# Patient Record
Sex: MALE | Race: WHITE | NOT HISPANIC OR LATINO | Employment: UNEMPLOYED | URBAN - METROPOLITAN AREA
[De-identification: names, ages, dates, MRNs, and addresses within clinical notes are randomized per-mention and may not be internally consistent; named-entity substitution may affect disease eponyms.]

---

## 2017-04-09 ENCOUNTER — APPOINTMENT (OUTPATIENT)
Dept: CT IMAGING | Age: 57
End: 2017-04-09
Attending: PHYSICIAN ASSISTANT

## 2017-04-09 ENCOUNTER — APPOINTMENT (OUTPATIENT)
Dept: GENERAL RADIOLOGY | Age: 57
End: 2017-04-09
Attending: PHYSICIAN ASSISTANT

## 2017-04-09 ENCOUNTER — HOSPITAL ENCOUNTER (OUTPATIENT)
Age: 57
Setting detail: OBSERVATION
Discharge: HOME | End: 2017-04-10
Attending: HOSPITALIST | Admitting: HOSPITALIST

## 2017-04-09 DIAGNOSIS — Z95.1 HISTORY OF CORONARY ARTERY BYPASS GRAFT: ICD-10-CM

## 2017-04-09 DIAGNOSIS — R07.9 CHEST PAIN, UNSPECIFIED TYPE: Primary | ICD-10-CM

## 2017-04-09 LAB
ALBUMIN SERPL-MCNC: 3.4 G/DL (ref 3.6–5.1)
ALBUMIN/GLOB SERPL: 1.2 {RATIO} (ref 1–2.4)
ALP SERPL-CCNC: 66 UNITS/L (ref 45–117)
ALT SERPL-CCNC: 47 UNITS/L
ANION GAP BLD CALC-SCNC: 19 MMOL/L
ANION GAP SERPL CALC-SCNC: 14 MMOL/L (ref 10–20)
AST SERPL-CCNC: 46 UNITS/L
BASOPHILS # BLD AUTO: 0 K/MCL (ref 0–0.3)
BASOPHILS NFR BLD AUTO: 0 %
BILIRUB SERPL-MCNC: 0.4 MG/DL (ref 0.2–1)
BNP SERPL-MCNC: 26 PG/ML
BUN BLD-MCNC: 23 MG/DL (ref 6–20)
BUN SERPL-MCNC: 20 MG/DL (ref 6–20)
BUN/CREAT SERPL: 31 (ref 7–25)
CA-I BLD ISE-SCNC: 1.14 MMOL/L (ref 1.15–1.29)
CALCIUM SERPL-MCNC: 8.6 MG/DL (ref 8.4–10.2)
CHLORIDE BLD-SCNC: 106 MMOL/L (ref 98–107)
CHLORIDE SERPL-SCNC: 106 MMOL/L (ref 98–107)
CO2 BLD-SCNC: 22 MMOL/L (ref 19–24)
CO2 SERPL-SCNC: 24 MMOL/L (ref 21–32)
CREAT BLD-MCNC: 0.6 MG/DL (ref 0.67–1.17)
CREAT SERPL-MCNC: 0.64 MG/DL (ref 0.67–1.17)
D DIMER PPP FEU-MCNC: 1.64 MG/L (FEU)
DIFFERENTIAL METHOD BLD: ABNORMAL
EOSINOPHIL # BLD AUTO: 0.2 K/MCL (ref 0.1–0.5)
EOSINOPHIL NFR SPEC: 3 %
ERYTHROCYTE [DISTWIDTH] IN BLOOD: 15.8 % (ref 11–15)
GLOBULIN SER-MCNC: 2.8 G/DL (ref 2–4)
GLUCOSE BLD-MCNC: 105 MG/DL (ref 65–99)
GLUCOSE SERPL-MCNC: 106 MG/DL (ref 65–99)
HCT VFR BLD CALC: 31.9 % (ref 39–51)
HCT VFR BLD CALC: 33 % (ref 39–51)
HGB BLD-MCNC: 10.5 G/DL (ref 13–17)
HGB BLD-MCNC: 11.2 G/DL (ref 13–17)
LIPASE SERPL-CCNC: 239 UNITS/L (ref 73–393)
LYMPHOCYTES # BLD MANUAL: 1.5 K/MCL (ref 1–4)
LYMPHOCYTES NFR BLD MANUAL: 21 %
MCH RBC QN AUTO: 31.4 PG (ref 26–34)
MCHC RBC AUTO-ENTMCNC: 32.9 G/DL (ref 32–36.5)
MCV RBC AUTO: 95.5 FL (ref 78–100)
MONOCYTES # BLD MANUAL: 0.5 K/MCL (ref 0.3–0.9)
MONOCYTES NFR BLD MANUAL: 6 %
NEUTROPHILS # BLD AUTO: 4.8 K/MCL (ref 1.8–7.7)
NEUTROPHILS NFR BLD AUTO: 70 %
PLATELET # BLD: 190 K/MCL (ref 140–450)
POTASSIUM BLD-SCNC: 4 MMOL/L (ref 3.4–5.1)
POTASSIUM SERPL-SCNC: 4.1 MMOL/L (ref 3.4–5.1)
PROT SERPL-MCNC: 6.2 G/DL (ref 6.4–8.2)
RBC # BLD: 3.34 MIL/MCL (ref 4.5–5.9)
SODIUM BLD-SCNC: 142 MMOL/L (ref 135–145)
SODIUM SERPL-SCNC: 140 MMOL/L (ref 135–145)
TROPONIN I BLD-MCNC: <0.1 NG/ML
TROPONIN I SERPL-MCNC: <0.02 NG/ML
WBC # BLD: 7 K/MCL (ref 4.2–11)

## 2017-04-09 PROCEDURE — 85025 COMPLETE CBC W/AUTO DIFF WBC: CPT

## 2017-04-09 PROCEDURE — 84484 ASSAY OF TROPONIN QUANT: CPT

## 2017-04-09 PROCEDURE — 99285 EMERGENCY DEPT VISIT HI MDM: CPT

## 2017-04-09 PROCEDURE — 93005 ELECTROCARDIOGRAM TRACING: CPT | Performed by: PHYSICIAN ASSISTANT

## 2017-04-09 PROCEDURE — 36415 COLL VENOUS BLD VENIPUNCTURE: CPT

## 2017-04-09 PROCEDURE — 71020 XR CHEST PA AND LATERAL: CPT

## 2017-04-09 PROCEDURE — 71020 XR CHEST PA AND LATERAL: CPT | Performed by: RADIOLOGY

## 2017-04-09 PROCEDURE — 85379 FIBRIN DEGRADATION QUANT: CPT

## 2017-04-09 PROCEDURE — 83690 ASSAY OF LIPASE: CPT

## 2017-04-09 PROCEDURE — 10004651 HB RX, NO CHARGE ITEM: Performed by: PHYSICIAN ASSISTANT

## 2017-04-09 PROCEDURE — 80047 BASIC METABLC PNL IONIZED CA: CPT

## 2017-04-09 PROCEDURE — 83880 ASSAY OF NATRIURETIC PEPTIDE: CPT

## 2017-04-09 PROCEDURE — 80053 COMPREHEN METABOLIC PANEL: CPT

## 2017-04-09 PROCEDURE — 99285 EMERGENCY DEPT VISIT HI MDM: CPT | Performed by: PHYSICIAN ASSISTANT

## 2017-04-09 PROCEDURE — 93010 ELECTROCARDIOGRAM REPORT: CPT | Performed by: EMERGENCY MEDICINE

## 2017-04-09 PROCEDURE — 36000 PLACE NEEDLE IN VEIN: CPT

## 2017-04-09 RX ORDER — METOPROLOL TARTRATE 50 MG/1
50 TABLET, FILM COATED ORAL 2 TIMES DAILY
COMMUNITY

## 2017-04-09 RX ORDER — NITROGLYCERIN 0.4 MG/1
0.4 TABLET SUBLINGUAL EVERY 5 MIN PRN
COMMUNITY

## 2017-04-09 RX ORDER — ASPIRIN 81 MG/1
324 TABLET, CHEWABLE ORAL ONCE
Status: COMPLETED | OUTPATIENT
Start: 2017-04-09 | End: 2017-04-09

## 2017-04-09 RX ORDER — PHENYTOIN SODIUM 100 MG/1
100 CAPSULE, EXTENDED RELEASE ORAL DAILY
COMMUNITY

## 2017-04-09 RX ADMIN — ASPIRIN 81 MG 324 MG: 81 TABLET ORAL at 22:44

## 2017-04-09 ASSESSMENT — PAIN SCALES - GENERAL
PAINLEVEL_OUTOF10: 0
PAINLEVEL_OUTOF10: 10

## 2017-04-10 ENCOUNTER — APPOINTMENT (OUTPATIENT)
Dept: CT IMAGING | Age: 57
End: 2017-04-10
Attending: HOSPITALIST

## 2017-04-10 ENCOUNTER — APPOINTMENT (OUTPATIENT)
Dept: NUCLEAR MEDICINE | Age: 57
End: 2017-04-10
Attending: HOSPITALIST

## 2017-04-10 ENCOUNTER — HOSPITAL ENCOUNTER (EMERGENCY)
Age: 57
Discharge: HOME | End: 2017-04-10
Attending: EMERGENCY MEDICINE

## 2017-04-10 VITALS
RESPIRATION RATE: 16 BRPM | DIASTOLIC BLOOD PRESSURE: 57 MMHG | SYSTOLIC BLOOD PRESSURE: 118 MMHG | WEIGHT: 230 LBS | BODY MASS INDEX: 36.1 KG/M2 | OXYGEN SATURATION: 100 % | TEMPERATURE: 98.2 F | HEIGHT: 67 IN | HEART RATE: 80 BPM

## 2017-04-10 VITALS
RESPIRATION RATE: 20 BRPM | BODY MASS INDEX: 35.71 KG/M2 | SYSTOLIC BLOOD PRESSURE: 139 MMHG | DIASTOLIC BLOOD PRESSURE: 93 MMHG | OXYGEN SATURATION: 94 % | WEIGHT: 227.51 LBS | TEMPERATURE: 98.6 F | HEART RATE: 103 BPM | HEIGHT: 67 IN

## 2017-04-10 DIAGNOSIS — R52 BODY ACHES: Primary | ICD-10-CM

## 2017-04-10 PROBLEM — F41.9 ANXIETY: Status: ACTIVE | Noted: 2017-04-10

## 2017-04-10 PROBLEM — R07.9 CHEST PAIN: Status: ACTIVE | Noted: 2017-04-10

## 2017-04-10 PROBLEM — Z95.1 HISTORY OF CORONARY ARTERY BYPASS GRAFT: Status: ACTIVE | Noted: 2017-04-10

## 2017-04-10 LAB
ALBUMIN SERPL-MCNC: 3.4 G/DL (ref 3.6–5.1)
ALP SERPL-CCNC: 62 UNITS/L (ref 45–117)
ALT SERPL-CCNC: 42 UNITS/L
ANION GAP BLD CALC-SCNC: 18 MMOL/L
ANION GAP SERPL CALC-SCNC: 13 MMOL/L (ref 10–20)
AST SERPL-CCNC: 31 UNITS/L
ATRIAL RATE (BPM): 71
ATRIAL RATE (BPM): 97
BASOPHILS # BLD AUTO: 0 K/MCL (ref 0–0.3)
BASOPHILS NFR BLD AUTO: 0 %
BILIRUB CONJ SERPL-MCNC: 0.1 MG/DL (ref 0–0.2)
BILIRUB SERPL-MCNC: 0.6 MG/DL (ref 0.2–1)
BUN BLD-MCNC: 12 MG/DL (ref 6–20)
BUN SERPL-MCNC: 18 MG/DL (ref 6–20)
BUN/CREAT SERPL: 32 (ref 7–25)
CA-I BLD ISE-SCNC: 1.06 MMOL/L (ref 1.15–1.29)
CALCIUM SERPL-MCNC: 8.2 MG/DL (ref 8.4–10.2)
CHLORIDE BLD-SCNC: 102 MMOL/L (ref 98–107)
CHLORIDE SERPL-SCNC: 106 MMOL/L (ref 98–107)
CHOLEST SERPL-MCNC: 171 MG/DL
CHOLEST/HDLC SERPL: 3.2 {RATIO}
CO2 BLD-SCNC: 25 MMOL/L (ref 19–24)
CO2 SERPL-SCNC: 25 MMOL/L (ref 21–32)
CREAT BLD-MCNC: 0.7 MG/DL (ref 0.67–1.17)
CREAT SERPL-MCNC: 0.57 MG/DL (ref 0.67–1.17)
DIFFERENTIAL METHOD BLD: ABNORMAL
EOSINOPHIL # BLD AUTO: 0.2 K/MCL (ref 0.1–0.5)
EOSINOPHIL NFR SPEC: 2 %
ERYTHROCYTE [DISTWIDTH] IN BLOOD: 15.9 % (ref 11–15)
GLUCOSE BLD-MCNC: 136 MG/DL (ref 65–99)
GLUCOSE SERPL-MCNC: 107 MG/DL (ref 65–99)
HCT VFR BLD CALC: 33 % (ref 39–51)
HCT VFR BLD CALC: 34.8 % (ref 39–51)
HDLC SERPL-MCNC: 53 MG/DL
HGB BLD-MCNC: 11.2 G/DL (ref 13–17)
HGB BLD-MCNC: 11.4 G/DL (ref 13–17)
LDLC SERPL-MCNC: 95 MG/DL
LYMPHOCYTES # BLD MANUAL: 1.5 K/MCL (ref 1–4)
LYMPHOCYTES NFR BLD MANUAL: 20 %
MCH RBC QN AUTO: 30.9 PG (ref 26–34)
MCHC RBC AUTO-ENTMCNC: 32.8 G/DL (ref 32–36.5)
MCV RBC AUTO: 94.3 FL (ref 78–100)
MONOCYTES # BLD MANUAL: 0.7 K/MCL (ref 0.3–0.9)
MONOCYTES NFR BLD MANUAL: 9 %
MRSA DNA SPEC QL NAA+PROBE: NOT DETECTED
NEUTROPHILS # BLD AUTO: 5.5 K/MCL (ref 1.8–7.7)
NEUTROPHILS NFR BLD AUTO: 69 %
NONHDLC SERPL-MCNC: 118 MG/DL
P AXIS (DEGREES): 58
P AXIS (DEGREES): 65
PLATELET # BLD: 258 K/MCL (ref 140–450)
POTASSIUM BLD-SCNC: 4.1 MMOL/L (ref 3.4–5.1)
POTASSIUM SERPL-SCNC: 3.9 MMOL/L (ref 3.4–5.1)
PR-INTERVAL (MSEC): 150
PR-INTERVAL (MSEC): 166
PROT SERPL-MCNC: 6.3 G/DL (ref 6.4–8.2)
QRS-INTERVAL (MSEC): 82
QRS-INTERVAL (MSEC): 88
QT-INTERVAL (MSEC): 360
QT-INTERVAL (MSEC): 428
QTC: 457
QTC: 465
R AXIS (DEGREES): 43
R AXIS (DEGREES): 57
RBC # BLD: 3.69 MIL/MCL (ref 4.5–5.9)
REPORT TEXT: NORMAL
SODIUM BLD-SCNC: 141 MMOL/L (ref 135–145)
SODIUM SERPL-SCNC: 140 MMOL/L (ref 135–145)
SPECIMEN SOURCE: NORMAL
T AXIS (DEGREES): 61
T AXIS (DEGREES): 74
TRIGL SERPL-MCNC: 113 MG/DL
TROPONIN I BLD-MCNC: <0.1 NG/ML
TROPONIN I BLD-MCNC: <0.1 NG/ML
TROPONIN I SERPL-MCNC: <0.02 NG/ML
VENTRICULAR RATE EKG/MIN (BPM): 71
VENTRICULAR RATE EKG/MIN (BPM): 97
WBC # BLD: 7.9 K/MCL (ref 4.2–11)

## 2017-04-10 PROCEDURE — 93017 CV STRESS TEST TRACING ONLY: CPT | Performed by: HOSPITALIST

## 2017-04-10 PROCEDURE — 71275 CT ANGIOGRAPHY CHEST: CPT | Performed by: RADIOLOGY

## 2017-04-10 PROCEDURE — 99285 EMERGENCY DEPT VISIT HI MDM: CPT | Performed by: EMERGENCY MEDICINE

## 2017-04-10 PROCEDURE — 84484 ASSAY OF TROPONIN QUANT: CPT

## 2017-04-10 PROCEDURE — 10004651 HB RX, NO CHARGE ITEM: Performed by: HOSPITALIST

## 2017-04-10 PROCEDURE — 80061 LIPID PANEL: CPT

## 2017-04-10 PROCEDURE — G0378 HOSPITAL OBSERVATION PER HR: HCPCS

## 2017-04-10 PROCEDURE — 80076 HEPATIC FUNCTION PANEL: CPT

## 2017-04-10 PROCEDURE — 99220 INITIAL OBSERVATION CARE,LEVL III: CPT | Performed by: HOSPITALIST

## 2017-04-10 PROCEDURE — 96372 THER/PROPH/DIAG INJ SC/IM: CPT | Performed by: HOSPITALIST

## 2017-04-10 PROCEDURE — A9500 TC99M SESTAMIBI: HCPCS

## 2017-04-10 PROCEDURE — 93018 CV STRESS TEST I&R ONLY: CPT | Performed by: INTERNAL MEDICINE

## 2017-04-10 PROCEDURE — 93016 CV STRESS TEST SUPVJ ONLY: CPT | Performed by: INTERNAL MEDICINE

## 2017-04-10 PROCEDURE — 10002800 HB RX 250 W HCPCS: Performed by: HOSPITALIST

## 2017-04-10 PROCEDURE — 10002803 HB RX 637: Performed by: INTERNAL MEDICINE

## 2017-04-10 PROCEDURE — 36415 COLL VENOUS BLD VENIPUNCTURE: CPT

## 2017-04-10 PROCEDURE — 80048 BASIC METABOLIC PNL TOTAL CA: CPT

## 2017-04-10 PROCEDURE — 78452 HT MUSCLE IMAGE SPECT MULT: CPT | Performed by: INTERNAL MEDICINE

## 2017-04-10 PROCEDURE — 93017 CV STRESS TEST TRACING ONLY: CPT | Performed by: INTERNAL MEDICINE

## 2017-04-10 PROCEDURE — 87641 MR-STAPH DNA AMP PROBE: CPT

## 2017-04-10 PROCEDURE — 10002803 HB RX 637: Performed by: HOSPITALIST

## 2017-04-10 PROCEDURE — 99244 OFF/OP CNSLTJ NEW/EST MOD 40: CPT | Performed by: PSYCHIATRY & NEUROLOGY

## 2017-04-10 PROCEDURE — 85025 COMPLETE CBC W/AUTO DIFF WBC: CPT

## 2017-04-10 PROCEDURE — 71275 CT ANGIOGRAPHY CHEST: CPT

## 2017-04-10 PROCEDURE — 10002805 HB CONTRAST AGENT: Performed by: RADIOLOGY

## 2017-04-10 RX ORDER — ACETAMINOPHEN 325 MG/1
650 TABLET ORAL EVERY 4 HOURS PRN
Status: DISCONTINUED | OUTPATIENT
Start: 2017-04-10 | End: 2017-04-10 | Stop reason: HOSPADM

## 2017-04-10 RX ORDER — TRAZODONE HYDROCHLORIDE 50 MG/1
50 TABLET ORAL NIGHTLY
Status: DISCONTINUED | OUTPATIENT
Start: 2017-04-10 | End: 2017-04-10 | Stop reason: HOSPADM

## 2017-04-10 RX ORDER — NITROGLYCERIN 0.4 MG/1
0.4 TABLET SUBLINGUAL EVERY 5 MIN PRN
Status: DISCONTINUED | OUTPATIENT
Start: 2017-04-10 | End: 2017-04-10 | Stop reason: HOSPADM

## 2017-04-10 RX ORDER — LEVETIRACETAM 500 MG/1
500 TABLET ORAL DAILY
COMMUNITY

## 2017-04-10 RX ORDER — ASPIRIN 81 MG/1
81 TABLET ORAL DAILY
Qty: 30 TABLET | Refills: 0 | Status: SHIPPED | OUTPATIENT
Start: 2017-04-10

## 2017-04-10 RX ORDER — ENOXAPARIN SODIUM 100 MG/ML
40 INJECTION SUBCUTANEOUS EVERY 24 HOURS
Status: DISCONTINUED | OUTPATIENT
Start: 2017-04-10 | End: 2017-04-10 | Stop reason: HOSPADM

## 2017-04-10 RX ORDER — CLONAZEPAM 0.5 MG/1
.5-1 TABLET ORAL 2 TIMES DAILY PRN
Status: DISCONTINUED | OUTPATIENT
Start: 2017-04-10 | End: 2017-04-10 | Stop reason: HOSPADM

## 2017-04-10 RX ORDER — REGADENOSON 0.08 MG/ML
0.4 INJECTION, SOLUTION INTRAVENOUS ONCE
Status: COMPLETED | OUTPATIENT
Start: 2017-04-10 | End: 2017-04-10

## 2017-04-10 RX ORDER — PHENYTOIN SODIUM 100 MG/1
100 CAPSULE, EXTENDED RELEASE ORAL DAILY
Status: DISCONTINUED | OUTPATIENT
Start: 2017-04-10 | End: 2017-04-10 | Stop reason: HOSPADM

## 2017-04-10 RX ORDER — LEVETIRACETAM 500 MG/1
500 TABLET ORAL DAILY
Status: DISCONTINUED | OUTPATIENT
Start: 2017-04-10 | End: 2017-04-10 | Stop reason: HOSPADM

## 2017-04-10 RX ORDER — METOPROLOL TARTRATE 50 MG/1
50 TABLET, FILM COATED ORAL 2 TIMES DAILY
Status: DISCONTINUED | OUTPATIENT
Start: 2017-04-10 | End: 2017-04-10 | Stop reason: HOSPADM

## 2017-04-10 RX ORDER — LORAZEPAM 2 MG/ML
1 INJECTION INTRAMUSCULAR ONCE
Status: COMPLETED | OUTPATIENT
Start: 2017-04-10 | End: 2017-04-10

## 2017-04-10 RX ADMIN — LORAZEPAM 1 MG: 2 INJECTION INTRAMUSCULAR; INTRAVENOUS at 23:04

## 2017-04-10 RX ADMIN — ASPIRIN 81 MG: 81 TABLET ORAL at 12:09

## 2017-04-10 RX ADMIN — TRAZODONE HYDROCHLORIDE 50 MG: 50 TABLET, FILM COATED ORAL at 01:54

## 2017-04-10 RX ADMIN — ENOXAPARIN SODIUM 40 MG: 40 INJECTION SUBCUTANEOUS at 01:54

## 2017-04-10 RX ADMIN — IOPAMIDOL 90 ML: 755 INJECTION, SOLUTION INTRAVENOUS at 10:10

## 2017-04-10 RX ADMIN — LEVETIRACETAM 500 MG: 500 TABLET, FILM COATED ORAL at 12:09

## 2017-04-10 RX ADMIN — SODIUM CHLORIDE 2 ML: 9 INJECTION, SOLUTION INTRAMUSCULAR; INTRAVENOUS; SUBCUTANEOUS at 13:47

## 2017-04-10 RX ADMIN — PHENYTOIN SODIUM 100 MG: 100 CAPSULE, EXTENDED RELEASE ORAL at 12:09

## 2017-04-10 RX ADMIN — METOPROLOL TARTRATE 50 MG: 50 TABLET, FILM COATED ORAL at 17:45

## 2017-04-10 RX ADMIN — REGADENOSON 0.4 MG: 0.08 INJECTION, SOLUTION INTRAVENOUS at 11:24

## 2017-04-10 ASSESSMENT — ACTIVITIES OF DAILY LIVING (ADL)
ADL_SCORE: 24
ADL_SHORT_OF_BREATH: NO
FEEDING: INDEPENDENT
ADL_BEFORE_ADMISSION: INDEPENDENT
DRESSING: INDEPENDENT
RECENT_DECLINE_ADL: NO
TOILETING: INDEPENDENT
CHRONIC_PAIN_PRESENT: NO
BATHING: INDEPENDENT

## 2017-04-10 ASSESSMENT — LIFESTYLE VARIABLES
E-CIGARETTE_USE: NO E-CIGARETTES USE IN THE LAST 30 DAYS
HOW OFTEN DO YOU HAVE 6 OR MORE DRINKS ON ONE OCCASION: NEVER
HOW OFTEN DO YOU HAVE A DRINK CONTAINING ALCOHOL: NEVER
AUDIT-C TOTAL SCORE: 0
ALCOHOL_USE_STATUS: NO OR LOW RISK WITH VALIDATED TOOL
HOW MANY STANDARD DRINKS CONTAINING ALCOHOL DO YOU HAVE ON A TYPICAL DAY: 0,1 OR 2
TOBACCO_USE_STATUS_IN_THE_LAST_30_DAYS: NO TOBACCO USED IN THE LAST 30 DAYS

## 2017-04-10 ASSESSMENT — PAIN SCALES - GENERAL
PAIN_LEVEL_AT_REST: 0
PAIN_LEVEL_AT_REST: 0
PAIN_LEVEL_AT_REST: 2
PAINLEVEL_OUTOF10: 10
PAINLEVEL_OUTOF10: 10
PAIN_LEVEL_AT_REST: 0

## 2017-04-10 ASSESSMENT — COGNITIVE AND FUNCTIONAL STATUS - GENERAL
ARE YOU DEAF OR DO YOU HAVE SERIOUS DIFFICULTY  HEARING: NO
ARE YOU BLIND OR DO YOU HAVE SERIOUS DIFFICULTY SEEING, EVEN WHEN WEARING GLASSES: NO

## 2017-04-11 LAB
ATRIAL RATE (BPM): 82
P AXIS (DEGREES): 54
PR-INTERVAL (MSEC): 158
QRS-INTERVAL (MSEC): 82
QT-INTERVAL (MSEC): 392
QTC: 457
R AXIS (DEGREES): 44
REPORT TEXT: NORMAL
T AXIS (DEGREES): 61
VENTRICULAR RATE EKG/MIN (BPM): 82

## 2017-04-13 ENCOUNTER — TELEPHONE (OUTPATIENT)
Dept: BEHAVIORAL HEALTH | Age: 57
End: 2017-04-13

## 2017-04-13 PROCEDURE — 36415 COLL VENOUS BLD VENIPUNCTURE: CPT

## 2017-04-13 PROCEDURE — 99283 EMERGENCY DEPT VISIT LOW MDM: CPT

## 2017-04-14 ENCOUNTER — HOSPITAL ENCOUNTER (EMERGENCY)
Age: 57
Discharge: HOME | End: 2017-04-14
Attending: EMERGENCY MEDICINE

## 2017-04-14 VITALS
HEART RATE: 92 BPM | TEMPERATURE: 99.3 F | OXYGEN SATURATION: 97 % | DIASTOLIC BLOOD PRESSURE: 72 MMHG | WEIGHT: 230 LBS | HEIGHT: 67 IN | SYSTOLIC BLOOD PRESSURE: 132 MMHG | RESPIRATION RATE: 20 BRPM | BODY MASS INDEX: 36.1 KG/M2

## 2017-04-14 DIAGNOSIS — F41.9 ANXIETY: Primary | ICD-10-CM

## 2017-04-14 LAB
ATRIAL RATE (BPM): 92
BASOPHILS # BLD AUTO: 0 K/MCL (ref 0–0.3)
BASOPHILS NFR BLD AUTO: 0 %
DIFFERENTIAL METHOD BLD: ABNORMAL
EOSINOPHIL # BLD AUTO: 0.1 K/MCL (ref 0.1–0.5)
EOSINOPHIL NFR SPEC: 2 %
ERYTHROCYTE [DISTWIDTH] IN BLOOD: 15.7 % (ref 11–15)
HCT VFR BLD CALC: 35.1 % (ref 39–51)
HEMOCCULT STL QL: NORMAL
HGB BLD-MCNC: 11.7 G/DL (ref 13–17)
LYMPHOCYTES # BLD MANUAL: 1.6 K/MCL (ref 1–4)
LYMPHOCYTES NFR BLD MANUAL: 20 %
MCH RBC QN AUTO: 31.3 PG (ref 26–34)
MCHC RBC AUTO-ENTMCNC: 33.3 G/DL (ref 32–36.5)
MCV RBC AUTO: 93.9 FL (ref 78–100)
MONOCYTES # BLD MANUAL: 0.6 K/MCL (ref 0.3–0.9)
MONOCYTES NFR BLD MANUAL: 8 %
NEUTROPHILS # BLD AUTO: 5.3 K/MCL (ref 1.8–7.7)
NEUTROPHILS NFR BLD AUTO: 70 %
P AXIS (DEGREES): 63
PLATELET # BLD: 257 K/MCL (ref 140–450)
PR-INTERVAL (MSEC): 160
QRS-INTERVAL (MSEC): 84
QT-INTERVAL (MSEC): 382
QTC: 472
R AXIS (DEGREES): 56
RBC # BLD: 3.74 MIL/MCL (ref 4.5–5.9)
REPORT TEXT: NORMAL
T AXIS (DEGREES): 67
VENTRICULAR RATE EKG/MIN (BPM): 92
WBC # BLD: 7.6 K/MCL (ref 4.2–11)

## 2017-04-14 PROCEDURE — 10002803 HB RX 637: Performed by: EMERGENCY MEDICINE

## 2017-04-14 PROCEDURE — 85025 COMPLETE CBC W/AUTO DIFF WBC: CPT

## 2017-04-14 PROCEDURE — 93005 ELECTROCARDIOGRAM TRACING: CPT | Performed by: EMERGENCY MEDICINE

## 2017-04-14 PROCEDURE — 82271 OCCULT BLOOD OTHER SOURCES: CPT | Performed by: EMERGENCY MEDICINE

## 2017-04-14 PROCEDURE — 99284 EMERGENCY DEPT VISIT MOD MDM: CPT | Performed by: EMERGENCY MEDICINE

## 2017-04-14 PROCEDURE — 93010 ELECTROCARDIOGRAM REPORT: CPT | Performed by: EMERGENCY MEDICINE

## 2017-04-14 RX ORDER — CLONAZEPAM 1 MG/1
1 TABLET ORAL ONCE
Status: COMPLETED | OUTPATIENT
Start: 2017-04-14 | End: 2017-04-14

## 2017-04-14 RX ADMIN — CLONAZEPAM 1 MG: 1 TABLET ORAL at 01:04

## 2017-04-14 ASSESSMENT — PAIN SCALES - GENERAL
PAINLEVEL_OUTOF10: 10
PAINLEVEL_OUTOF10: 10

## 2017-04-19 ENCOUNTER — HOSPITAL ENCOUNTER (EMERGENCY)
Age: 57
Discharge: HOME | End: 2017-04-19

## 2017-04-19 VITALS
SYSTOLIC BLOOD PRESSURE: 129 MMHG | RESPIRATION RATE: 16 BRPM | TEMPERATURE: 98.4 F | DIASTOLIC BLOOD PRESSURE: 91 MMHG | HEIGHT: 67 IN | HEART RATE: 117 BPM | OXYGEN SATURATION: 98 % | WEIGHT: 230 LBS | BODY MASS INDEX: 36.1 KG/M2

## 2017-04-19 DIAGNOSIS — F41.9 ANXIETY: Primary | ICD-10-CM

## 2017-04-19 PROCEDURE — 99282 EMERGENCY DEPT VISIT SF MDM: CPT | Performed by: PHYSICIAN ASSISTANT

## 2017-04-19 PROCEDURE — 99283 EMERGENCY DEPT VISIT LOW MDM: CPT

## 2017-04-19 ASSESSMENT — PAIN SCALES - GENERAL
PAINLEVEL_OUTOF10: 10
PAINLEVEL_OUTOF10: 0

## 2017-04-24 ENCOUNTER — OFF PREMISE (OUTPATIENT)
Dept: OTHER | Age: 57
End: 2017-04-24

## 2017-05-11 ENCOUNTER — HOSPITAL ENCOUNTER (EMERGENCY)
Age: 57
Discharge: HOME | End: 2017-05-11
Attending: EMERGENCY MEDICINE

## 2017-05-11 VITALS
SYSTOLIC BLOOD PRESSURE: 113 MMHG | BODY MASS INDEX: 34.53 KG/M2 | TEMPERATURE: 97.5 F | RESPIRATION RATE: 18 BRPM | OXYGEN SATURATION: 100 % | DIASTOLIC BLOOD PRESSURE: 60 MMHG | HEIGHT: 67 IN | WEIGHT: 220 LBS | HEART RATE: 90 BPM

## 2017-05-11 DIAGNOSIS — G40.909 SEIZURE DISORDER (CMD): Primary | ICD-10-CM

## 2017-05-11 LAB
BASE DEFICIT BLDV-SCNC: 4 MMOL/L (ref 0–2)
CA-I BLD ISE-SCNC: 0.78 MMOL/L (ref 1.15–1.29)
CO2 BLD-SCNC: 18 MMOL/L (ref 19–24)
ETHANOL SERPL-MCNC: NORMAL MG/DL
GLUCOSE BLD-MCNC: 74 MG/DL (ref 65–99)
HCO3 BLDV-SCNC: 17 MMOL/L (ref 22–28)
HCT VFR BLD CALC: 32 % (ref 39–51)
HGB BLD-MCNC: 10.9 G/DL (ref 13–17)
PCO2 BLDV: 16 MM HG (ref 41–54)
PH BLDV: 7.65 UNITS (ref 7.35–7.45)
PHENYTOIN SERPL-MCNC: <0.5 MCG/ML (ref 10–20)
PO2 BLDV: 115 MM HG (ref 35–42)
POTASSIUM BLD-SCNC: 3.8 MMOL/L (ref 3.4–5.1)
SAO2 % BLDV: 99 % (ref 60–80)
SODIUM BLD-SCNC: 140 MMOL/L (ref 135–145)

## 2017-05-11 PROCEDURE — 99284 EMERGENCY DEPT VISIT MOD MDM: CPT | Performed by: EMERGENCY MEDICINE

## 2017-05-11 PROCEDURE — 96365 THER/PROPH/DIAG IV INF INIT: CPT | Performed by: NURSE PRACTITIONER

## 2017-05-11 PROCEDURE — 84132 ASSAY OF SERUM POTASSIUM: CPT

## 2017-05-11 PROCEDURE — 80320 DRUG SCREEN QUANTALCOHOLS: CPT

## 2017-05-11 PROCEDURE — 99284 EMERGENCY DEPT VISIT MOD MDM: CPT | Performed by: NURSE PRACTITIONER

## 2017-05-11 PROCEDURE — 10002807 HB RX 258: Performed by: EMERGENCY MEDICINE

## 2017-05-11 PROCEDURE — 96360 HYDRATION IV INFUSION INIT: CPT | Performed by: NURSE PRACTITIONER

## 2017-05-11 PROCEDURE — 80185 ASSAY OF PHENYTOIN TOTAL: CPT

## 2017-05-11 PROCEDURE — 93005 ELECTROCARDIOGRAM TRACING: CPT | Performed by: EMERGENCY MEDICINE

## 2017-05-11 PROCEDURE — 10002800 HB RX 250 W HCPCS: Performed by: EMERGENCY MEDICINE

## 2017-05-11 RX ADMIN — SODIUM CHLORIDE 1000 ML: 9 INJECTION, SOLUTION INTRAVENOUS at 20:23

## 2017-05-11 RX ADMIN — FOSPHENYTOIN SODIUM 1500 MG PE: 50 INJECTION INTRAMUSCULAR; INTRAVENOUS at 21:32

## 2017-05-11 ASSESSMENT — ENCOUNTER SYMPTOMS
CHILLS: 0
CONFUSION: 0
BACK PAIN: 0
EYE PAIN: 0
SHORTNESS OF BREATH: 0
SEIZURES: 1
WOUND: 0
FEVER: 0
HEADACHES: 0
ABDOMINAL PAIN: 0

## 2017-05-11 ASSESSMENT — MOVEMENT AND STRENGTH ASSESSMENTS: FACE_JAW: NO FACIAL DROOP

## 2017-05-12 LAB
ATRIAL RATE (BPM): 91
P AXIS (DEGREES): 63
PR-INTERVAL (MSEC): 156
QRS-INTERVAL (MSEC): 88
QT-INTERVAL (MSEC): 356
QTC: 431
R AXIS (DEGREES): 57
REPORT TEXT: NORMAL
T AXIS (DEGREES): 63
VENTRICULAR RATE EKG/MIN (BPM): 88

## 2018-05-31 ENCOUNTER — HOSPITAL ENCOUNTER (INPATIENT)
Dept: HOSPITAL 36 - GERO | Age: 58
LOS: 9 days | Discharge: NURSING, REHAB OR LONG-TERM CARE | DRG: 885 | End: 2018-06-09
Attending: PSYCHIATRY & NEUROLOGY | Admitting: PSYCHIATRY & NEUROLOGY
Payer: MEDICARE

## 2018-05-31 VITALS — DIASTOLIC BLOOD PRESSURE: 96 MMHG | SYSTOLIC BLOOD PRESSURE: 163 MMHG

## 2018-05-31 DIAGNOSIS — E11.65: ICD-10-CM

## 2018-05-31 DIAGNOSIS — Z91.81: ICD-10-CM

## 2018-05-31 DIAGNOSIS — G40.909: ICD-10-CM

## 2018-05-31 DIAGNOSIS — E66.9: ICD-10-CM

## 2018-05-31 DIAGNOSIS — Z91.5: ICD-10-CM

## 2018-05-31 DIAGNOSIS — F29: ICD-10-CM

## 2018-05-31 DIAGNOSIS — F33.2: Primary | ICD-10-CM

## 2018-05-31 DIAGNOSIS — M19.90: ICD-10-CM

## 2018-05-31 DIAGNOSIS — F43.10: ICD-10-CM

## 2018-05-31 DIAGNOSIS — I10: ICD-10-CM

## 2018-05-31 DIAGNOSIS — Z95.1: ICD-10-CM

## 2018-05-31 DIAGNOSIS — I25.10: ICD-10-CM

## 2018-05-31 PROCEDURE — Z7610: HCPCS

## 2018-06-01 LAB
ALBUMIN SERPL-MCNC: 4.1 GM/DL (ref 4.2–5.5)
ALBUMIN/GLOB SERPL: 1.8 {RATIO} (ref 1–1.8)
ALP SERPL-CCNC: 87 U/L (ref 34–104)
ALT SERPL-CCNC: 20 U/L (ref 7–52)
AMYLASE SERPL-CCNC: 18 U/L (ref 29–103)
ANION GAP SERPL CALC-SCNC: 13.3 MMOL/L (ref 7–16)
AST SERPL-CCNC: 20 U/L (ref 13–39)
BASOPHILS # BLD AUTO: 0.1 TH/CUMM (ref 0–0.2)
BASOPHILS NFR BLD AUTO: 1.6 % (ref 0–2)
BILIRUB SERPL-MCNC: 0.5 MG/DL (ref 0.3–1)
BUN SERPL-MCNC: 7 MG/DL (ref 7–25)
CALCIUM SERPL-MCNC: 9.1 MG/DL (ref 8.6–10.3)
CHLORIDE SERPL-SCNC: 104 MEQ/L (ref 98–107)
CHOLEST SERPL-MCNC: 149 MG/DL (ref ?–200)
CO2 SERPL-SCNC: 26.3 MEQ/L (ref 21–31)
CREAT SERPL-MCNC: 0.7 MG/DL (ref 0.7–1.3)
EOSINOPHIL # BLD AUTO: 0.2 TH/CMM (ref 0.1–0.4)
EOSINOPHIL NFR BLD AUTO: 2.9 % (ref 0–5)
ERYTHROCYTE [DISTWIDTH] IN BLOOD BY AUTOMATED COUNT: 13.5 % (ref 11.5–20)
GLOBULIN SER-MCNC: 2.3 GM/DL
GLUCOSE SERPL-MCNC: 210 MG/DL (ref 70–105)
HBA1C MFR BLD: 8.4 % (ref 4–6)
HCT VFR BLD CALC: 36.8 % (ref 41–60)
HDLC SERPL-MCNC: 33 MG/DL (ref 23–92)
HGB BLD-MCNC: 11 G/DL (ref 4–35)
HGB BLD-MCNC: 12.6 GM/DL (ref 12–16)
INR PPP: 0.95 (ref 0.5–1.4)
LIPASE SERPL-CCNC: 24 U/L (ref 11–82)
LYMPHOCYTE AB SER FC-ACNC: 2 TH/CMM (ref 1.5–3)
LYMPHOCYTES NFR BLD AUTO: 25.7 % (ref 20–50)
MCH RBC QN AUTO: 31.8 PG (ref 26–30)
MCHC RBC AUTO-ENTMCNC: 34.3 PG (ref 28–36)
MCV RBC AUTO: 92.8 FL (ref 80–99)
MONOCYTES # BLD AUTO: 0.4 TH/CMM (ref 0.3–1)
MONOCYTES NFR BLD AUTO: 5 % (ref 2–10)
NEUTROPHILS # BLD: 4.9 TH/CMM (ref 1.8–8)
NEUTROPHILS NFR BLD AUTO: 64.8 % (ref 40–80)
PHENYTOIN SERPL-MCNC: 6.3 UG/ML (ref 10–20)
PLATELET # BLD: 213 TH/CMM (ref 150–400)
PMV BLD AUTO: 8.3 FL
POTASSIUM SERPL-SCNC: 3.6 MEQ/L (ref 3.5–5.1)
PROTHROMBIN TIME: 9.9 SECONDS (ref 9.5–11.5)
RBC # BLD AUTO: 3.97 MIL/CMM (ref 4.3–5.7)
SODIUM SERPL-SCNC: 140 MEQ/L (ref 136–145)
TRIGL SERPL-MCNC: 261 MG/DL (ref ?–150)
WBC # BLD AUTO: 7.6 TH/CMM (ref 4.8–10.8)

## 2018-06-01 RX ADMIN — INSULIN ASPART SCH UNIT: 100 INJECTION, SOLUTION INTRAVENOUS; SUBCUTANEOUS at 16:50

## 2018-06-01 RX ADMIN — INSULIN ASPART SCH UNIT: 100 INJECTION, SOLUTION INTRAVENOUS; SUBCUTANEOUS at 20:07

## 2018-06-01 RX ADMIN — INSULIN ASPART SCH UNIT: 100 INJECTION, SOLUTION INTRAVENOUS; SUBCUTANEOUS at 11:24

## 2018-06-01 NOTE — PSYCHOSOCIAL EVALUATION
DATE OF SERVICE:  05/31/2018



IDENTIFYING INFORMATION:  The patient is a 58-year-old male.



CHIEF COMPLAINT:  "I have overdosed."



HISTORY OF PRESENT ILLNESS:  The patient reports that he had overdosed on pills,

he said on Dilantin.  He was put on a hold for danger to self.  He wanted to

kill himself.  However, the hold states he took 30 Benadryl.  He said he took

Dilantin.  He was diagnosed with PTSD.  He has been depressed.  He wanted to

harm himself.  When I talked to him, he is not a very good historian.  He admits

to 2 prior suicide attempts with 3 prior hospitalizations.  He denies any

substance abuse.  The patient reports no current auditory or visual

hallucination, no paranoia; however, he said he was diagnosed with

schizophrenia.  He said he is on disability because of heart problems.



PAST PSYCHIATRIC HISTORY:  According to old records, the patient was diagnosed

here back in 2015 because of being suicidal.  He also has a history of being an

alcoholic.  He was in [place] in the past, attempted to overdose in the

past.  He used to be on Paxil.



MEDICAL HISTORY:  The patient has seizure disorder.



ALLERGIES:  He has no known drug allergies.



MEDICATIONS:  The patient is on metoprolol, multivitamin, nitroglycerin.  He is

on Paxil 20 mg a day and Dilantin 200 mg twice a day, which he used to be on in

the past.



FAMILY AND SOCIAL HISTORY:  The patient is alcoholic.  The patient reports he

has been  twice, , has 3 children, on disability for heart

problems, 12th grade education.  He used to work as a .  He lives in a

board and care.  He denies any family psychotic disorder, no legal problems, not

drinking.



MENTAL STATUS EXAMINATION:  The patient is appropriately dressed, not well

groomed.  He looks somewhat disheveled.  His mood is depressed.  Affect is sad. 

Thoughts are concrete.  He was unable to tell me the date.  He believes his YOB: 1960 but he believes he is 49 years of age.  He reports that

his sleep is not very well.  Appetite varies.  He denies now that he wants to

harm himself.  He was unable to tell me the name of the President of [place]

[place].  Concentration is poor.  Unable to answer questions appropriately

sometimes and spell his first name forward and backward.  Long-term memory is

good.  He can remember the date of birth but not his age.  Recent memory is

good.  He can remember that I am coming here.  His insight about his illness is

poor.  Judgment is poor.  He has been trying to harm himself.



IMPRESSION:

AXIS I:  Major depression, recurrent, severe, with no psychosis.  History of

alcohol dependence.



MEDICAL DIAGNOSES:  Heart problems, seizure disorder.



ASSETS:  He wants to get help.  Negative poor coping skills.



INITIAL TREATMENT PLAN:  The patient will be continued with Paxil.  We will do

group therapy, milieu therapy, and individual therapy.



ESTIMATED LENGTH OF STAY:  3-7 days.



DISCHARGE CRITERIA:  Decrease in depression, ____ discharge.





DD: 06/01/2018 11:52

DT: 06/01/2018 12:39

JOB# [id number]  [id number]

## 2018-06-01 NOTE — HISTORY & PHYSICAL
ADMIT DATE:  06/01/2018



PATIENT'S IDENTIFICATION:  A 58-year-old male.



REQUESTING PHYSICIAN:  Dr. Bhavik Gates.



PRESENTING COMPLAINT:  "I tried to kill myself."



HISTORY SOURCE:  Talking to the patient and reviewing the chart.



HISTORY OF PRESENT ILLNESS:  This is a 58-year-old  male who resides in

[place], visiting his daughter for graduation of ___son, presented to the

[facility] after the patient tried to kill himself with

overdosing Benadryl.  The patient was medically cleared and subsequently

[facility].



PAST MEDICAL HISTORY:  Remarkable for:

1.  Hypertension.

2.  Coronary artery disease, status post bypass graft.

3.  Seizure disorder.

4.  Diabetes.

5.  Obesity.

6.  Hypertension.

7.  DJD.

8.  Previous history of multiple suicidal attempts.



MEDICATIONS AT HOME:  Metoprolol, Norvasc, paroxetine, Dilantin.



ALLERGIES:  The patient is allergic to medications.



SOCIAL HISTORY:  The patient lives in [facility].  The patient has

no history of smoking cigarette, drinking alcohol, using street drug use.



FAMILY MEDICAL HISTORY:  Remarkable for diabetes and hypertension.



REVIEW OF SYSTEMS:  The patient currently denies any headache, blurred vision,

double vision, dysphagia, odynophagia, runny nose, stuffy nose, fever, chills,

cough, chest pain, shortness of breath, palpitation, dizziness, nausea,

vomiting, diarrhea, dysuria, hematuria, hematochezia, melena.  No history of any

seizure or syncopal episode.



PHYSICAL EXAMINATION:

GENERAL:  The patient is alert, awake, oriented, lying in the bed without any

acute distress.

VITAL SIGNS:  Temperature 97.5, pulse 91, respiratory rate 18, blood pressure

102/65.

SKIN:  Warm to touch.

HEENT:  Normocephalic, atraumatic.  Extraocular muscles are intact.  Tongue was

pink and coated.  Poor dentition noted.  No oral lesion noted.  No sinus

tenderness.

NECK:  Supple, no JVD.  No hepatojugular reflux.  No lymphadenopathy,

thyromegaly, or carotid bruit.

HEART:  Both heart sounds are regular.  Tachycardia noted.  No S3, no S4.

CHEST AND LUNGS:  Equal in expansion, no wheezing, no crackles.

ABDOMEN:  Protuberant, soft.  No guarding, no rigidity.  Bowel sounds are

present.  No palpable mass.

EXTREMITIES:  No edema, no cyanosis or clubbing.  Peripheral pulses are +2.  No

calf tenderness noted.

NEUROLOGIC:  Alert, awake, oriented to time, place, person.  2-12 cranial nerves

intact.  Power in upper and lower extremities 5-.  Sensation to touch is intact.

 Babinski in both toes are going down.  Cerebellar sign.  The patient has

broad-based gait with little bit of landing on his left leg noted and otherwise

nonfocal.



AVAILABLE DIAGNOSTIC DATA:  CBC:  White count of 9.7, hemoglobin 12.8, platelet

count 199.  Sodium 134, potassium 4.0, chloride 99, CO2 24, glucose 327, BUN and

creatinine are 4 and 0.83.  Liver functions are normal.  Tylenol level is less

than 10.  Salicylate level was less than 0.  Urine drug screen was negative. 

EKG has no ST-T changes representing acute ischemia.  Urinalysis remarkable for

significant for glucosuria noted.



CLINICAL IMPRESSIONS:

1.  Psychotic disorder exacerbation.

2.  Status post Benadryl overdose.

3.  Diabetes mellitus.

4.  Hypertension.

5.  Hyperlipidemia.

6.  Coronary artery disease, status post bypass graft.

7.  Degenerative joint disease.

8.  Seizure disorder.

9.  Obesity.



PLAN:  The patient is admitted at this time to [facility].  Psychiatric

illness management deferred to psychiatrist.  The patient is to resume his home

medication at this time.  Oral hypoglycemic agent will be added.  Seizure

precaution will be given.  Appropriate home medicine reconciliation for

hypertension, hyperlipidemia will be added, p.r.n. nitroglycerin will be added

as well.  The patient is to have fall precaution as well.  Dietary restriction

will be provided.  Nutritional support will be given as well and we will

continue to follow this patient during the stay in the hospital.



I sincerely thank you, Dr. Bhavik Gates for giving me the opportunity to

participate in patient of yours.  Please note, the patient is medically stable

to participate in the activity per the [facility].





DD: 06/01/2018 09:15

DT: 06/01/2018 12:17

JOB# [id number]  [id number]

## 2018-06-02 RX ADMIN — INSULIN ASPART SCH: 100 INJECTION, SOLUTION INTRAVENOUS; SUBCUTANEOUS at 11:27

## 2018-06-02 RX ADMIN — INSULIN ASPART SCH UNIT: 100 INJECTION, SOLUTION INTRAVENOUS; SUBCUTANEOUS at 06:31

## 2018-06-02 RX ADMIN — INSULIN ASPART SCH UNIT: 100 INJECTION, SOLUTION INTRAVENOUS; SUBCUTANEOUS at 20:32

## 2018-06-02 RX ADMIN — INSULIN ASPART SCH UNIT: 100 INJECTION, SOLUTION INTRAVENOUS; SUBCUTANEOUS at 18:36

## 2018-06-02 NOTE — PROGRESS NOTES
DATE:  06/02/2018



SUBJECTIVE:  The patient is currently in the hospital, overdosed on pills, put

on a hold.  He apparently wanted to kill himself, also took 30 Benadryl,

diagnosed with PTSD.  The patient not wanting to speak with me today, somewhat

resistant, isolative, depressed, withdrawn, ongoing concerns for suicidality. 

Staff noting he remains depressed, did not sleep very well, only 5 hours, lot of

anxiety, worries throughout the night, episodes of confusion.  Medications were

noted including doses and frequencies.



ASSESSMENT:  The patient remains depressed, withdrawn, highly anxious.  Ongoing

insomnia.  Concerns for suicidality.



PLAN:  We will continue to monitor.  We will err on the side of caution,

initiate a 14-day hold.





DD: 06/02/2018 06:41

DT: 06/02/2018 17:48

JOB# [id number]  [id number]

## 2018-06-03 LAB
APPEARANCE UR: (no result)
BACTERIA #/AREA URNS HPF: (no result) /HPF
BILIRUB UR-MCNC: (no result) MG/DL
COLOR UR: YELLOW
EPI CELLS URNS QL MICRO: (no result) /LPF
GLUCOSE UR STRIP-MCNC: NEGATIVE MG/DL
KETONES UR STRIP-MCNC: NEGATIVE MG/DL
LEUKOCYTE ESTERASE UR-ACNC: NEGATIVE
MICRO URNS: YES
NITRITE UR QL STRIP: NEGATIVE
PH UR STRIP: 5.5 [PH] (ref 4.6–8)
PROT UR STRIP-MCNC: NEGATIVE MG/DL
RBC # UR STRIP: NEGATIVE /UL
RBC #/AREA URNS HPF: (no result) /HPF (ref 0–5)
SP GR UR STRIP: >= 1.03 (ref 1–1.03)
URINALYSIS COMPLETE PNL UR: (no result)
UROBILINOGEN UR STRIP-ACNC: 0.2 E.U./DL (ref 0.2–1)
WBC #/AREA URNS HPF: (no result) /HPF (ref 0–5)

## 2018-06-03 RX ADMIN — INSULIN ASPART SCH: 100 INJECTION, SOLUTION INTRAVENOUS; SUBCUTANEOUS at 17:29

## 2018-06-03 RX ADMIN — INSULIN ASPART SCH UNIT: 100 INJECTION, SOLUTION INTRAVENOUS; SUBCUTANEOUS at 12:02

## 2018-06-03 RX ADMIN — INSULIN ASPART SCH UNIT: 100 INJECTION, SOLUTION INTRAVENOUS; SUBCUTANEOUS at 20:26

## 2018-06-03 RX ADMIN — INSULIN ASPART SCH UNIT: 100 INJECTION, SOLUTION INTRAVENOUS; SUBCUTANEOUS at 07:33

## 2018-06-03 NOTE — PROGRESS NOTES
DATE:  06/03/2018



SUBJECTIVE:  The patient seen and examined.  The patient is lying in the bed. 

The patient denies any chest pain, shortness of breath, palpitation, dizziness,

nausea, vomiting, diarrhea.



PHYSICAL EXAMINATION:

VITAL SIGNS:  See nurse's note.

HEENT:  Poor dentition.

NECK:  Supple, no JVD.

HEART:  Regular.

CHEST:  Lung equal in expansion, no wheezing, no crackles.

ABDOMEN:  Soft.

EXTREMITIES:  No edema.

NEUROLOGIC:  Alert, awake, follows commands.  Decreased power in lower extremity

noted.



CLINICAL IMPRESSION:

1.  Psychotic disorder exacerbation.

2.  Status post Benadryl overdose.

3.  Diabetes.

4.  Hypertension.

5.  Hyperlipidemia.

6.  Coronary artery disease.

7.  Degenerative joint disease.

8.  Psychotic disorder.



PLAN:

1.  Psychotic evaluation and management deferred to psychiatrist.

2.  Monitor blood sugar.

3.  Sliding scale insulin.

4.  Antihypertensive medicine.

5.  Statin.

6.  Antiplatelet therapy.

7.  P.r.n. nitro.

8.  Seizure medication.

9.  Seizure precautions.

10.  General nursing care.

11.  Fall precautions.

12.  Care plan reviewed and discussed with staff.





DD: 06/03/2018 14:30

DT: 06/03/2018 16:40

JOB# [id number]  [id number]

## 2018-06-03 NOTE — PROGRESS NOTES
DATE:  



SUBJECTIVE:  The patient seen, chart reviewed, discussed with staff.  The

patient is currently in the hospital, made a suicide effort, wanted to hurt

himself.  The patient is more amenable to speaking with me today.  States he has

depression and wants to go home, highly impoverished on exam, ongoing

confusional episodes, ongoing concern for suicidality, high impulsivity, mostly

in his room, withdrawn, appearing depressed, melancholic.  Staff noting some

anxiety, slept between 5 and 7 hours, still noted to be labile, sad.



ASSESSMENT:  The patient remains symptomatic, still depressed, withdrawn, status

post suicide attempt.  Medications were reviewed including dosages and

frequencies.



PLAN:  We will continue to monitor.  No current evidence of any withdrawal

symptoms.  We will continue Paxil.





DD: 06/03/2018 06:55

DT: 06/03/2018 09:29

JOB# [id number]  [id number]

## 2018-06-04 RX ADMIN — INSULIN ASPART SCH UNIT: 100 INJECTION, SOLUTION INTRAVENOUS; SUBCUTANEOUS at 22:12

## 2018-06-04 RX ADMIN — INSULIN ASPART SCH: 100 INJECTION, SOLUTION INTRAVENOUS; SUBCUTANEOUS at 11:54

## 2018-06-04 RX ADMIN — INSULIN ASPART SCH UNIT: 100 INJECTION, SOLUTION INTRAVENOUS; SUBCUTANEOUS at 06:50

## 2018-06-04 RX ADMIN — INSULIN ASPART SCH UNIT: 100 INJECTION, SOLUTION INTRAVENOUS; SUBCUTANEOUS at 17:48

## 2018-06-05 RX ADMIN — PAROXETINE HYDROCHLORIDE SCH MG: 20 TABLET, FILM COATED ORAL at 08:36

## 2018-06-05 RX ADMIN — INSULIN ASPART SCH UNIT: 100 INJECTION, SOLUTION INTRAVENOUS; SUBCUTANEOUS at 07:00

## 2018-06-05 RX ADMIN — INSULIN ASPART SCH UNIT: 100 INJECTION, SOLUTION INTRAVENOUS; SUBCUTANEOUS at 12:00

## 2018-06-05 RX ADMIN — INSULIN ASPART SCH UNITS: 100 INJECTION, SOLUTION INTRAVENOUS; SUBCUTANEOUS at 17:00

## 2018-06-05 RX ADMIN — INSULIN ASPART SCH UNITS: 100 INJECTION, SOLUTION INTRAVENOUS; SUBCUTANEOUS at 20:39

## 2018-06-05 NOTE — PROGRESS NOTES
DATE:  06/04/2018



SUBJECTIVE:  Chart reviewed and the patient interviewed.  Also discussed the

patient's condition with the staff and reviewed records and labs.  The patient

remains anxious and in a depressed mood.  The patient also is still complaining

of feeling shaky.  The patient also is cooperative, but he is isolative and in a

depressed mood.  Today staff received a call from his physician in [place]

asking about the patient.



The patient, during interview, seems to be depressed and anxious.  The patient

also is asking to be attending his grandson's graduation, but he was confused

and did not know the date. The patient has been forgetful.  On the other hand,

the patient admitted to his drinking problem and he is motivated to stay sober.



ASSESSMENT:  The patient is still depressed and is still at high risk of

relapse.



TREATMENT PLAN:  Continue detoxification.  Also, continue monitoring his

condition and adjusting psychotropic medications and also continue to work on

his rehabilitation.





DD: 06/04/2018 21:13

DT: 06/05/2018 19:52

JOB# [id number]  [id number]

## 2018-06-05 NOTE — PROGRESS NOTES
DATE:  06/04/2018



SUBJECTIVE:  The patient seen and examined.  The patient is lying in the bed. 

The patient denies any chest pain, shortness of breath, palpitation, dizziness,

nausea, vomiting, diarrhea, headache, seizure, or syncopal episode.



PHYSICAL EXAMINATION:

VITAL SIGNS:  Temperature 97.6, pulse is 74, respiratory rate 18, and blood

pressure 134/80.

HEENT:  No facial asymmetry.  Poor dentition noted.

NECK:  Supple, no JVD.

HEART:  Both heart sounds are regular.

CHEST:  Equal in expansion, no wheezing, no crackles.

ABDOMEN:  Soft and protuberant.  No guarding, no rigidity.  Bowel sounds are

present.  No palpable mass.

EXTREMITIES:  No edema.



MEDICATION:  Admission record reviewed.



CLINICAL IMPRESSION:

1.  Psychotic disorder exacerbation.

2.  Diabetes.

3.  Hypertension.

4.  Hyperlipidemia.

5.  Coronary artery disease.

6.  Degenerative joint disease.

7.  Fall risk.



PLAN:

1.  Psychotic evaluation and management deferred to psychiatrist.

2.  Monitor blood sugar and blood pressure.

3.  Diabetes management.

4.  Statin.

5.  Antihypertensive medicine.

6.  Antiplatelet therapy.

7.  General nursing care.

8.  Fall precautions.

9.  Care plan reviewed and discussed with staff.





DD: 06/04/2018 09:47

DT: 06/05/2018 00:39

JOB# [id number]  [id number]

## 2018-06-06 RX ADMIN — INSULIN ASPART SCH UNITS: 100 INJECTION, SOLUTION INTRAVENOUS; SUBCUTANEOUS at 21:27

## 2018-06-06 RX ADMIN — PAROXETINE HYDROCHLORIDE SCH MG: 20 TABLET, FILM COATED ORAL at 08:42

## 2018-06-06 RX ADMIN — INSULIN ASPART SCH UNITS: 100 INJECTION, SOLUTION INTRAVENOUS; SUBCUTANEOUS at 12:00

## 2018-06-06 RX ADMIN — INSULIN ASPART SCH UNITS: 100 INJECTION, SOLUTION INTRAVENOUS; SUBCUTANEOUS at 06:44

## 2018-06-06 RX ADMIN — INSULIN ASPART SCH UNITS: 100 INJECTION, SOLUTION INTRAVENOUS; SUBCUTANEOUS at 16:41

## 2018-06-06 NOTE — PROGRESS NOTES
DATE:  



SUBJECTIVE:  Chart reviewed and the patient interviewed.  Also discussed the

patient's condition with the staff and reviewed the records and labs.  The

patient remains severely anxious and is still severely depressed.  The patient

also is preoccupied with " my son's graduation is on 7th."  He is minimizing

his drinking.  The patient also is still in a depressed mood and he is still

guarded and withdrawn.  Also, he is still minimizing his drinking.  The patient

is still high-risk relapse.  He is alert and oriented.  He still has episodes of

being impulsive and also mumbles to himself and also still has periods of

talking about his desire to hurt himself.  Otherwise, the patient is compliant

with taking his medications with no side effects of medications.



ASSESSMENT:  The patient is still depressed and high-risk relapse.



TREATMENT PLAN:  We will continue detoxification.  Also, we will increase Paxil

to 30 mg every day and we will continue to follow up his behavior and his

condition closely.





DD: 06/05/2018 07:18

DT: 06/05/2018 23:59

[facility]# [id number]  [id number]

## 2018-06-07 RX ADMIN — INSULIN ASPART SCH UNITS: 100 INJECTION, SOLUTION INTRAVENOUS; SUBCUTANEOUS at 17:29

## 2018-06-07 RX ADMIN — INSULIN ASPART SCH UNITS: 100 INJECTION, SOLUTION INTRAVENOUS; SUBCUTANEOUS at 11:27

## 2018-06-07 RX ADMIN — INSULIN ASPART SCH UNITS: 100 INJECTION, SOLUTION INTRAVENOUS; SUBCUTANEOUS at 20:16

## 2018-06-07 RX ADMIN — INSULIN ASPART SCH: 100 INJECTION, SOLUTION INTRAVENOUS; SUBCUTANEOUS at 06:54

## 2018-06-07 NOTE — PROGRESS NOTES
DATE:  



IDENTIFICATION:  This is a 58-year-old male.



SUBJECTIVE:  The patient is seen and examined.  The patient lying in the bed. 

The patient has no new complaints.  The patient is ambulatory on discussed with

nursing staff about their treatment plan and other concerns.



OBJECTIVE:

VITAL SIGNS:  Temperature 98.1, pulse is 83, respiratory rate is 18, blood

pressure 132/73.  Glucoscan is reviewed as well.

HEENT:  No facial asymmetry.

NECK:  Supple, no JVD.

HEART:  Regular.

CHEST AND LUNGS:  Equal in expansion, no wheezing, no crackles.

ABDOMEN:  Soft.

EXTREMITIES:  No edema.



CLINICAL IMPRESSION:

1.  Alcohol detoxification.  Currently on Librium and p.r.n. Ativan.

2.  Psychotic disorder exacerbation.

3.  Diabetes.

4.  Hypertension.

5.  Coronary artery disease.

6.  Degenerative joint disease.

7.  Seizure disorder.

8.  Fall precautions.



PLAN:

1.  Dilantin.

2.  Seizure precautions.

3.  p.r.n. nitro.

4.  Antiplatelet therapy.

5.  Beta blocker.

6.  Sliding scale insulin.

7.  General nursing care.

8.  Librium.

9.  Follow lab.

10.  Psychiatric evaluation and management deferred to psychiatrist.

11.  Care plan reviewed and discussed with staff.





DD: 06/06/2018 10:57

DT: 06/07/2018 06:54

JOB# [id number]  [id number]

## 2018-06-07 NOTE — PROGRESS NOTES
DATE:  06/06/2018



PSYCHIATRIC PROGRESS NOTE



SUBJECTIVE:  Chart reviewed and the patient interviewed.  Also, discussed the

patient's condition with the staff and reviewed records and labs.  The patient

continued to be in angry and in irritable mood, but at the same time depressed. 

The patient continued to minimize his drinking issues and preoccupied with "my

son graduation."  The patient also is still feeling hopeless.  He is also

interacting minimally with others.  On the other hand, the patient is less

irritable and slightly easier to redirect him.



ASSESSMENT:  The patient is still depressed and is still high risk relapse.



TREATMENT PLAN:  Continue to monitor his behavior and his condition closely. 

Also, continue to work on his rehabilitation and also in his ineffective coping.

 Also, working with the patient in regard to his irritability and his anger and

mood swings and we will continue to follow up.





DD: 06/06/2018 09:43

DT: 06/07/2018 06:21

JOB# [id number]  [id number]

## 2018-06-08 RX ADMIN — INSULIN ASPART SCH UNITS: 100 INJECTION, SOLUTION INTRAVENOUS; SUBCUTANEOUS at 06:37

## 2018-06-08 RX ADMIN — INSULIN ASPART SCH UNITS: 100 INJECTION, SOLUTION INTRAVENOUS; SUBCUTANEOUS at 17:16

## 2018-06-08 RX ADMIN — INSULIN ASPART SCH UNITS: 100 INJECTION, SOLUTION INTRAVENOUS; SUBCUTANEOUS at 20:28

## 2018-06-08 RX ADMIN — INSULIN ASPART SCH UNITS: 100 INJECTION, SOLUTION INTRAVENOUS; SUBCUTANEOUS at 11:33

## 2018-06-08 NOTE — PROGRESS NOTES
DATE:  06/07/2018



SUBJECTIVE:  Chart reviewed and the patient interviewed.  Also discussed the

patient's condition with the staff and reviewed records and labs.  The patient

is still preoccupied, focusing on going to his son's graduation.  According to

the staff, it seems that the patient is confused and forgetful in regard to his

son's graduation and  talked with his ex-wife and she said that he

will not attend the son's graduation at this time with his current condition. 

The patient is argumentative and is asking if he can smoke marijuana while in

the hospital.  His judgment is still poor.  Otherwise, the patient is compliant

with taking his medications.  He still seems to be slightly paranoid and

suspicious.



ASSESSMENT:  The patient is still confused and is still at high risk of

relapsing.



TREATMENT PLAN:  Continue to monitor his behavior and his condition closely. 

Also, continue to work on his drinking.  Also, supportive therapy for the

patient.  Also, working with  in regard to discharge plans.  Also,

we will increase Paxil to 40 mg every day and we will continue to follow up.





DD: 06/07/2018 06:35

DT: 06/08/2018 02:33

JOB# [id number]  [id number]

## 2018-06-09 RX ADMIN — INSULIN ASPART SCH UNITS: 100 INJECTION, SOLUTION INTRAVENOUS; SUBCUTANEOUS at 06:33

## 2018-06-09 NOTE — PROGRESS NOTES
DATE:  06/08/2018



SUBJECTIVE:  Chart reviewed and the patient interviewed.  Also, discussed the

patient's condition with the staff and reviewed records and labs.  The patient

is still restless and is still in irritable mood.  The patient also still seems

to be hypomanic today and hyperactive and speech is pressured and did not sleep

much last night.  The patient also is argumentative and he keeps asking

questions over and over.  Otherwise, the patient is compliant with taking his

medications with no side effects of medications.  The patient also is still

asking at times for marijuana.  Otherwise, the patient is denying any thoughts

of suicide or homicide.



During interview, the patient is disheveled and rambling with pressured speech.



TREATMENT PLAN:  We will continue monitoring his condition and his medications

closely.  Also, we will continue to work on his discharge plans and so far it

seems that the patient's wife is trying to arrange transportation for him to go

back to his state where he has been living.





DD: 06/08/2018 06:22

DT: 06/09/2018 01:14

JOB# [id number]  [id number]

## 2018-06-09 NOTE — PROGRESS NOTES
DATE:  06/09/2018



DATE OF SERVICE:  06/09/2018



SUBJECTIVE:  The patient is currently in the hospital, overdosed on pills,

apparently ____.  In an effort to hurt himself, he took 30 Benadryl, diagnosed

with PTSD, depression, mostly isolative, withdrawn.  I did see this patient in

the past, Dr. Gates seeing the patient over the past couple of days, noting that

he remained restless, irritable, hyperactive.  Speech pressured, argumentative,

angry, at times asking for marijuana, noted to be rambling on exam.  Nursing

staff noting he slept well last night, very upset with  in the

past, had been screaming at the  at some point.



ASSESSMENT:  The patient remains impulsive, angry.  Efforts are being made to

get him over to assisted living and was ____.  He remains pretty withdrawn.  No

overt SI.



MEDICATIONS:  Reviewed.



ASSESSMENT:  The patient remains angry, still upset, withdrawn.  The patient

still needs some prompting redirection.  We will continue to monitor.





DD: 06/09/2018 06:47

DT: 06/09/2018 20:39

JOB# [id number]  [id number]

## 2018-06-09 NOTE — PROGRESS NOTES
DATE:  06/08/2018



IDENTIFICATION:  A 58-year-old male.



SUBJECTIVE:  The patient is seen and examined.  The patient is lying in the bed.

 The patient is ambulatory.  On further questioning, patient denies any chest

pain, shortness of breath, palpitation, dizziness, nausea, vomiting.  He wants

to go home.



PHYSICAL EXAMINATION:

VITAL SIGNS:  Temperature 97.7, pulse 83, respiratory rate 20, blood pressure

140/80.

HEENT:  No facial asymmetry.

NECK:  Supple, no JVD.

HEART:  Regular.

CHEST:  Equal in expansion, no wheezing, no crackles.

ABDOMEN:  Soft.  No guarding, no rigidity.  Bowel sounds are present.  No

palpable mass.

EXTREMITIES:  No edema.



CLINICAL IMPRESSION:

1.  Psychiatric disorder exacerbation.

2.  Diabetes mellitus.

3.  Hypertension.

4.  Hyperlipidemia.

5.  Coronary artery disease.

6.  Degenerative joint disease.

7.  Psychotic disorder.



PLAN:

1.  Psychotic evaluation and management deferred to psychiatrist.

2.  Continue current medication as prescribed for his underlying problem.

3.  General nursing care.

4.  Symptoms management.

5.  Medication management.

6.  We will continue to follow this patient during the stay in the hospital.





DD: 06/08/2018 09:16

DT: 06/09/2018 03:29

JOB# [id number]  [id number]